# Patient Record
(demographics unavailable — no encounter records)

---

## 2025-03-03 NOTE — PHYSICAL EXAM
[Chaperone Present] : A chaperone was present in the examining room during all aspects of the physical examination [FreeTextEntry2] : Starr [Examination Of The Breasts] : a normal appearance [No Masses] : no breast masses were palpable [Labia Majora] : normal [Labia Minora] : normal [Normal] : normal [Uterine Adnexae] : non-palpable

## 2025-03-18 NOTE — PROCEDURE
[FreeTextEntry1] : Aseptic debridement of 3 cm X 1 cm IPK (tyloma) sub left 3rd and 1 cm X 1.5 cm IPK (tyloma) sub right 5th with Verruca Freeze, Salinocaine and dispersive pads applied to the bases.  Aseptic mechanical debridement of thickened, elongated, dystrophic, painful mycotic toenails times ten.

## 2025-03-18 NOTE — HISTORY OF PRESENT ILLNESS
[FreeTextEntry1] :  Subjective:   - Summary: The patient, Elton Hoang, visits for continued treatment related to foot disorders.   - Chief Complaint (CC): Painful, thickening, elongated, mycotic, onychogryphoticc, dystrophic, discolored toenais times 10., and IPK calluses.   - History of Present Illness (HPI): The patient is under treatment for Onychomycosis manifesting as painful, thick, elongated, discolored, painful toenails and IPK calluses present sub-left third and sub-right fifth. This visit does not present any change in symptoms or course of disease.   - Past Medical History: No reported changes in the past medical history of the patient.    Objective:   - Diagnostic Results:    - Vital Signs: DP is 1/4 bilateral. PT is 1/4 bilateral. Temperature gradient is within normal limits.   - Physical Examination (PE): Physical examination revealed thick and elongated, onychomycotic, onychogryphotic, painful,, dystrophic, discolored, toenails times 10. Presence of intractable plantar keratoma, of dimensions 3cm by 1cm, sub-3 left and 1cm by 1.5cm, sub-5 right were observed.   Assessment:        - Onychomycosis     - Onychogryphosis     - Intractable Plantar Keratoma sub-3 left     - Intractable Plantar Keratoma sub-5 right        - Plan:     - Examination.     - Aseptic Mechanical Debridement of all thick elongated, dystrophic, onychomycotic, onychogryphotic, painful toenails times 10.     - Accepted Debridement of IPK or Tyloma sub-3 left and sub-5 right.     - Verruca Freeze and Salinocaine applied to the bases with dispersive pads.     - Follow-up in 2 months

## 2025-05-21 NOTE — PROCEDURE
[FreeTextEntry1] :  Aseptic mechanical debridement of thickened, elongated, dystrophic, painful mycotic toenails times ten. Aseptic debridement of IPK sub 3 bilaterally with verruca freeze, salinocaine, and dispersive pads applied.

## 2025-05-21 NOTE — HISTORY OF PRESENT ILLNESS
[FreeTextEntry1] :  Subjective:   - Summary: Patient presented for follow-up care of multiple foot issues including thickened, elongated, painful, onychomycotic, onychogryphotic, dystrophic toenails on all toes,, calluses, sub-3 bilateral, and generalized bilateral foot pain. Patient reports recent ED visit due to feeling unwell, but workup was negative.   - Chief Complaint (CC): Follow-up for multiple foot issues and generalized bilateral foot pain   - History of Present Illness (HPI): Patient presents with a history of thickened, elongated, painful, onychomycotic, onychogryphotic, dystrophic toenails affecting all toes. Additionally, the patient reports  calluses, sub-3 bilateral issues, and generalized bilateral foot pain. The patient recently visited the Emergency Department due to feeling unwell, but the workup was negative,     Objective:   - Diagnostic Results:    - Vital Signs:    - Physical Examination (PE): Dorsalis pedis pulse 2/4 bilaterally, posterior tibial pulse 1/4 bilaterally. Capillary refill time was 2 seconds times ten. TG: WNL B/L. There are thickened, elongated, painful, onychomycotic, onychogryphotic, dystrophic toenails times ten. There are 2 cm X 3 cm IPK's sub the third metatarsal heads bilaterally. Neurovascular status is intact bilaterally. Skin well-hydrated with good turgor.   Assessment:   - Summary: Patient presents with multiple foot issues including onychomycosis affecting all toenails, bilateral foot pain, and intractable plantar keratosis under the 3rd metatarsal heads bilaterally.   - Problems:     - Left foot pain     - Right foot pain     - Onychomycosis affecting all toenails -Onychogryphosis     - Intractable plantar keratosis (IPK) sub-3rd metatarsal heads bilaterally   - Differential Diagnosis:   Plan:   - Summary: The treatment plan includes mechanical debridement of the thickened, onychomycotic, dystrophic toenails, and treatment of the IPKs with dispersive pads. A follow-up appointment is scheduled in two months.   - Plan:     - Perform aseptic mechanical debridement of thickened, elongated, onychomycotic, onychogryphotic, dystrophic toenails times ten.     -Aseptic debridement of IPK's sub third metatarsal heads bilaterally with verruca freeze, salinocaine, and dispersive pads applied.     - Schedule follow-up appointment in 2 months